# Patient Record
Sex: FEMALE | Race: BLACK OR AFRICAN AMERICAN
[De-identification: names, ages, dates, MRNs, and addresses within clinical notes are randomized per-mention and may not be internally consistent; named-entity substitution may affect disease eponyms.]

---

## 2019-04-02 ENCOUNTER — HOSPITAL ENCOUNTER (OUTPATIENT)
Dept: HOSPITAL 53 - M ED | Age: 34
Setting detail: OBSERVATION
LOS: 2 days | Discharge: HOME | End: 2019-04-04
Attending: INTERNAL MEDICINE | Admitting: INTERNAL MEDICINE
Payer: COMMERCIAL

## 2019-04-02 VITALS — SYSTOLIC BLOOD PRESSURE: 119 MMHG | DIASTOLIC BLOOD PRESSURE: 77 MMHG

## 2019-04-02 VITALS — BODY MASS INDEX: 25.48 KG/M2 | HEIGHT: 67 IN | WEIGHT: 162.35 LBS

## 2019-04-02 VITALS — DIASTOLIC BLOOD PRESSURE: 85 MMHG | SYSTOLIC BLOOD PRESSURE: 128 MMHG

## 2019-04-02 VITALS — SYSTOLIC BLOOD PRESSURE: 127 MMHG | DIASTOLIC BLOOD PRESSURE: 87 MMHG

## 2019-04-02 DIAGNOSIS — M62.82: Primary | ICD-10-CM

## 2019-04-02 DIAGNOSIS — F41.9: ICD-10-CM

## 2019-04-02 DIAGNOSIS — D72.829: ICD-10-CM

## 2019-04-02 DIAGNOSIS — D64.9: ICD-10-CM

## 2019-04-02 DIAGNOSIS — E87.6: ICD-10-CM

## 2019-04-02 DIAGNOSIS — D57.3: ICD-10-CM

## 2019-04-02 LAB
ALBUMIN SERPL BCG-MCNC: 4.3 GM/DL (ref 3.2–5.2)
ALT SERPL W P-5'-P-CCNC: 38 U/L (ref 12–78)
BASOPHILS # BLD AUTO: 0 10^3/UL (ref 0–0.2)
BASOPHILS NFR BLD AUTO: 0.2 % (ref 0–1)
BILIRUB SERPL-MCNC: 0.5 MG/DL (ref 0.2–1)
BUN SERPL-MCNC: 13 MG/DL (ref 7–18)
CALCIUM SERPL-MCNC: 8.6 MG/DL (ref 8.5–10.1)
CHLORIDE SERPL-SCNC: 107 MEQ/L (ref 98–107)
CK MB CFR.DF SERPL CALC: 0.38
CK MB SERPL-MCNC: 7 NG/ML (ref ?–3.6)
CK SERPL-CCNC: 1911 U/L (ref 26–192)
CO2 SERPL-SCNC: 25 MEQ/L (ref 21–32)
CREAT SERPL-MCNC: 1.2 MG/DL (ref 0.55–1.3)
EOSINOPHIL # BLD AUTO: 0 10^3/UL (ref 0–0.5)
EOSINOPHIL NFR BLD AUTO: 0 % (ref 0–3)
GFR SERPL CREATININE-BSD FRML MDRD: > 60 ML/MIN/{1.73_M2} (ref 60–?)
GLUCOSE SERPL-MCNC: 102 MG/DL (ref 70–100)
HCT VFR BLD AUTO: 35.7 % (ref 36–47)
HGB BLD-MCNC: 12.1 G/DL (ref 12–15.5)
LYMPHOCYTES # BLD AUTO: 0.5 10^3/UL (ref 1.5–4.5)
LYMPHOCYTES NFR BLD AUTO: 2.7 % (ref 24–44)
MCH RBC QN AUTO: 26.5 PG (ref 27–33)
MCHC RBC AUTO-ENTMCNC: 33.9 G/DL (ref 32–36.5)
MCV RBC AUTO: 78.1 FL (ref 80–96)
MONOCYTES # BLD AUTO: 0.8 10^3/UL (ref 0–0.8)
MONOCYTES NFR BLD AUTO: 4.1 % (ref 0–5)
MYOGLOBIN UR QL: NEGATIVE
NEUTROPHILS # BLD AUTO: 16.9 10^3/UL (ref 1.8–7.7)
NEUTROPHILS NFR BLD AUTO: 92.3 % (ref 36–66)
PLATELET # BLD AUTO: 267 10^3/UL (ref 150–450)
POTASSIUM SERPL-SCNC: 3.4 MEQ/L (ref 3.5–5.1)
PROT SERPL-MCNC: 7.5 GM/DL (ref 6.4–8.2)
RBC # BLD AUTO: 4.57 10^6/UL (ref 4–5.4)
SODIUM SERPL-SCNC: 140 MEQ/L (ref 136–145)
WBC # BLD AUTO: 18.3 10^3/UL (ref 4–10)

## 2019-04-02 RX ADMIN — SODIUM CHLORIDE SCH MLS/HR: 9 INJECTION, SOLUTION INTRAVENOUS at 13:42

## 2019-04-02 RX ADMIN — SODIUM CHLORIDE SCH MLS/HR: 9 INJECTION, SOLUTION INTRAVENOUS at 11:45

## 2019-04-02 NOTE — HPEPDOC
General


Date of Admission


Apr 2, 2019 at 11:45


Chief Complaint


The patient is a 33-year-old female admitted with lower extremity weakness/ Rhab

domyolysis.


Source:  Patient


Exam Limitations:  No limitations





History of Present Illness


The patient is a 33-year-old female who presents today after running 11 miles in

a "ruck" march which involved carrying heavy weight as well. The patient reports

that total march was for 12 miles. However, towards the last mile, she reports 

she was very thirsty and hungry, was sweating and felt her legs giving way and 

was feeling too weak complete the march. The patient was seen by a medic at that

time. She reports she was cold, shaking and hungry. She reports she went back to

the hotel and was planning on going to sleep, however, she was advised by the 

medic to go to the hospital. She denied any associated headache/change in 

vision/nausea/vomiting/chest pain/shortness of breath/abdominal pain. Denies any

aching of her muscles. She reports her urine when she urinated last time was 

light yellow in color.





In the ER, patient was noticed to have rhabdomyolysis. Admission was requested 

given her lower extremity weakness in the setting of rhabdomyolysis for IV 

hydration and overnight observation.





Home Medications


No Active Prescriptions or Reported Meds





Allergies


Coded Allergies:  


     No Known Allergies (Unverified , 4/2/19)





Past Medical History


Medical History


Sickle cell trait, anxiety


Surgical History


Denies any previous surgeries. She has 2 kids born by natural childbirth.





Family History


Significant Family History:  No pertinent family hx


Patient denies any significant medical problems in her familythis was 

personally reviewed.





Social History


* Smoker:  Denies


Alcohol:  occationally


Drugs:  denies





Review of Systems


Other systems


Negative for 10 systems except as noted under history of present illness





Physical Examination


General Exam:  Positive: Alert, Cooperative, No Acute Distress


Eye Exam:  Positive: PERRLA


ENT Exam:  Positive: Mucous membr. moist/pink


Chest Exam:  Positive: Clear to auscultation, Normal air movement; 


   Negative: Rales, Rhonchi, Wheezing


Heart Exam:  Positive: Rate Normal, Normal S1, Normal S2; 


   Negative: Murmurs, Rubs


Abdomen Exam:  Positive: Soft, Other (nontender)


Extremity Exam:  Positive: Other (no tenderness to palpation overt thighs/ 

calves)


Skin Exam:  Positive: Nl turgor and temperature


Neuro Exam:  Positive: Normal Speech, Strength at 5/5 X4 ext, Cranial Nerves 3-

12 NL, Reflexes 2+


Psych Exam:  Positive: Mental status NL, Mood NL, Oriented x 3





Vital Signs





Vital Signs








  Date Time  Temp Pulse Resp B/P (MAP) Pulse Ox O2 Delivery O2 Flow Rate FiO2


 


4/2/19 20:00 97.7 69 20 119/77 (91) 98   


 


4/2/19 13:07      Room Air  











Laboratory Data


Labs 24H


Laboratory Tests 2


4/2/19 09:48: 


Anion Gap 8, Glomerular Filtration Rate > 60.0, Blood Urea Nitrogen 13, 

Creatinine 1.20, Sodium Level 140, Potassium Level 3.4L, Chloride Level 107, 

Carbon Dioxide Level 25, Calcium Level 8.6, Aspartate Amino Transf (AST/SGOT) 

56H, Alanine Aminotransferase (ALT/SGPT) 38, Total Creatine Kinase 1911H, 

Alkaline Phosphatase 69, Total Bilirubin 0.5, Total Protein 7.5, Albumin 4.3, 

Creatine Kinase MB 7.0H, Creatine Kinase MB Relative Index 0.38, 

Albumin/Globulin Ratio 1.34


4/2/19 09:49: 


Immature Granulocyte % (Auto) 0.7, White Blood Count 18.3H, Red Blood Count 

4.57, Hemoglobin 12.1, Hematocrit 35.7L, Mean Corpuscular Volume 78.1L, Mean 

Corpuscular Hemoglobin 26.5L, Mean Corpuscular Hemoglobin Concent 33.9, Red Cell

Distribution Width 14.5, Platelet Count 267, Neutrophils (%) (Auto) 92.3H, 

Lymphocytes (%) (Auto) 2.7L, Monocytes (%) (Auto) 4.1, Eosinophils (%) (Auto) 0.

0, Basophils (%) (Auto) 0.2, Neutrophils # (Auto) 16.9H, Lymphocytes # (Auto) 

0.5L, Monocytes # (Auto) 0.8, Eosinophils # (Auto) 0.0, Basophils # (Auto) 0.0, 

Nucleated Red Blood Cells % (auto) 0.0, Urine Color YELLOW, Urine Appearance 

HAZY, Urine pH 5.0, Urine Specific Gravity 1.012, Urine Protein 1+H, Urine 

Glucose (UA) NEGATIVE, Urine Ketones 1+H, Urine Blood NEGATIVE, Urine Nitrite 

NEGATIVE, Urine Bilirubin NEGATIVE, Urine Urobilinogen 0.2, Urine Leukocyte 

Esterase NEGATIVE, Urine WBC (Auto) 2, Urine RBC (Auto) 1, Urine Hyaline Casts 

(Auto) 0, Urine Bacteria (Auto) NEGATIVE, Urine Squamous Epithelial Cells 2, 

Urine Amorphous Sediment SMALLH, Urine Mucus (Auto) SMALL, Urine Sperm (Auto) , 

Urine Myoglobin NEGATIVE


CBC/BMP


Laboratory Tests


4/2/19 09:48








Calcium Level 8.6, Aspartate Amino Transf (AST/SGOT) 56 H, Alanine 

Aminotransferase (ALT/SGPT) 38, Total Creatine Kinase 1911 H, Alkaline 

Phosphatase 69, Total Bilirubin 0.5, Total Protein 7.5, Albumin 4.3





4/2/19 09:49








Red Blood Count 4.57, Mean Corpuscular Volume 78.1 L, Mean Corpuscular 

Hemoglobin 26.5 L, Mean Corpuscular Hemoglobin Concent 33.9, Red Cell 

Distribution Width 14.5, Neutrophils (%) (Auto) 92.3 H, Lymphocytes (%) (Auto) 

2.7 L, Monocytes (%) (Auto) 4.1, Eosinophils (%) (Auto) 0.0, Basophils (%) 

(Auto) 0.2, Neutrophils # (Auto) 16.9 H, Lymphocytes # (Auto) 0.5 L, Monocytes #

(Auto) 0.8, Eosinophils # (Auto) 0.0, Basophils # (Auto) 0.0





 Assessment/Plan


Acute rhabdomyolysis:


-Likely secondary to exertion


-Continue aggressive IV fluids


-Recheck CPK in a.m.


-Encouraged oral fluid intake





Lower extremity weakness:


-Subjective, likely related to above


-Normal strength on testing





Leucocytosis:


-Likely combination of dehydration and reactive


-Recheck in a.m.





Sickle cell trait:


-No intervention at this time





Hypokalemia:


-Replete and recheck





DVT prophylaxis:


-Low risk





CODE STATUS: Full code per my discussion with the patient





Disposition: Admitted as an observation status to medical surgical floor. 

Anticipated length of stay less than 2 midnights. Anticipate discharge home 

hopefully by tomorrow





Plan / VTE


VTE Prophylaxis Ordered?:  No


VTE Exclusion Mechanical Proph:  Low Risk for VTE


VTE Exclusion Pharmacological:  At Low Risk for VTE











JAS CODY MD               Apr 2, 2019 21:33

## 2019-04-03 VITALS — SYSTOLIC BLOOD PRESSURE: 130 MMHG | DIASTOLIC BLOOD PRESSURE: 80 MMHG

## 2019-04-03 VITALS — DIASTOLIC BLOOD PRESSURE: 87 MMHG | SYSTOLIC BLOOD PRESSURE: 129 MMHG

## 2019-04-03 VITALS — SYSTOLIC BLOOD PRESSURE: 130 MMHG | DIASTOLIC BLOOD PRESSURE: 88 MMHG

## 2019-04-03 VITALS — SYSTOLIC BLOOD PRESSURE: 121 MMHG | DIASTOLIC BLOOD PRESSURE: 77 MMHG

## 2019-04-03 VITALS — DIASTOLIC BLOOD PRESSURE: 82 MMHG | SYSTOLIC BLOOD PRESSURE: 124 MMHG

## 2019-04-03 LAB
ALBUMIN SERPL BCG-MCNC: 3.4 GM/DL (ref 3.2–5.2)
ALT SERPL W P-5'-P-CCNC: 35 U/L (ref 12–78)
BILIRUB SERPL-MCNC: 0.3 MG/DL (ref 0.2–1)
BUN SERPL-MCNC: 5 MG/DL (ref 7–18)
CALCIUM SERPL-MCNC: 7.7 MG/DL (ref 8.5–10.1)
CHLORIDE SERPL-SCNC: 115 MEQ/L (ref 98–107)
CK SERPL-CCNC: 2052 U/L (ref 26–192)
CO2 SERPL-SCNC: 23 MEQ/L (ref 21–32)
CREAT SERPL-MCNC: 0.88 MG/DL (ref 0.55–1.3)
GFR SERPL CREATININE-BSD FRML MDRD: > 60 ML/MIN/{1.73_M2} (ref 60–?)
GLUCOSE SERPL-MCNC: 86 MG/DL (ref 70–100)
HCT VFR BLD AUTO: 30.5 % (ref 36–47)
HGB BLD-MCNC: 10.4 G/DL (ref 12–15.5)
MAGNESIUM SERPL-MCNC: 2 MG/DL (ref 1.8–2.4)
MCH RBC QN AUTO: 26.3 PG (ref 27–33)
MCHC RBC AUTO-ENTMCNC: 34.1 G/DL (ref 32–36.5)
MCV RBC AUTO: 77.2 FL (ref 80–96)
PHOSPHATE SERPL-MCNC: 2.9 MG/DL (ref 2.5–4.9)
PLATELET # BLD AUTO: 228 10^3/UL (ref 150–450)
POTASSIUM SERPL-SCNC: 3.7 MEQ/L (ref 3.5–5.1)
PROT SERPL-MCNC: 5.8 GM/DL (ref 6.4–8.2)
RBC # BLD AUTO: 3.95 10^6/UL (ref 4–5.4)
SODIUM SERPL-SCNC: 144 MEQ/L (ref 136–145)
WBC # BLD AUTO: 5.7 10^3/UL (ref 4–10)

## 2019-04-03 RX ADMIN — SODIUM CHLORIDE SCH MLS/HR: 9 INJECTION, SOLUTION INTRAVENOUS at 09:37

## 2019-04-03 RX ADMIN — SODIUM CHLORIDE SCH MLS/HR: 4.5 INJECTION, SOLUTION INTRAVENOUS at 10:16

## 2019-04-03 RX ADMIN — SODIUM CHLORIDE SCH MLS/HR: 4.5 INJECTION, SOLUTION INTRAVENOUS at 14:43

## 2019-04-03 RX ADMIN — SODIUM CHLORIDE SCH MLS/HR: 4.5 INJECTION, SOLUTION INTRAVENOUS at 19:42

## 2019-04-03 RX ADMIN — SODIUM CHLORIDE SCH MLS/HR: 4.5 INJECTION, SOLUTION INTRAVENOUS at 23:45

## 2019-04-03 RX ADMIN — SODIUM CHLORIDE SCH MLS/HR: 9 INJECTION, SOLUTION INTRAVENOUS at 01:05

## 2019-04-03 NOTE — IPNPDOC
Subjective


Date Seen


The patient was seen on 4/3/19.





Subjective


Chief Complaint/HPI


Lower extremity weakness, rhabdomyolysis


Events since last encounter


The patient reports she is feeling better today. Was able to ambulate in the 

hallway without any problems. Tolerating ordered. No nausea vomiting. Denies any

chest pain or shortness of breath.





Objective


Physical Examination


General Exam:  Positive: Alert, Cooperative, No Acute Distress


Eye Exam:  Positive: PERRLA


ENT Exam:  Positive: Mucous membr. moist/pink


Chest Exam:  Positive: Clear to auscultation; 


   Negative: Rales, Rhonchi, Wheezing


Heart Exam:  Positive: Rate Normal, Normal S1, Normal S2


Abdomen Exam:  Positive: Soft, Other (nontender)


Skin Exam:  Positive: Nl turgor and temperature


Neuro Exam:  Positive: Other (strength 5 over 5 in bilateral lower extremities. 

Intact fine touch.)


Psych Exam:  Positive: Mental status NL, Mood NL, Oriented x 3





Assessment /Plan


Assessment


Acute rhabdomyolysis, worsening:


-Likely secondary to exertion


-Continue aggressive IV fluids - patient was on 200 mL per hour of half normal 

salinestill has worsening CK. I will increase IV fluids to 250 mL an hour.


-Recheck CK in a.m.


-Encouraged oral fluid intake





Lower extremity weakness:


-Subjective, likely related to above


-Has improved


-Normal strength on testing





Leucocytosis:


-Likely combination of dehydration and reactive


-Resolved with IV fluids





Sickle cell trait:


-No intervention at this time





Anemia:


-Likely dilutional related to IV fluids. Monitor. No indication for transfusion.





Hypokalemia:


-Resolved





DVT prophylaxis:


-Low risk





Dispositionpatient has worsening rhabdomyolysis despite aggressive IV 

hydration. She will require continued IV fluids at least until tomorrow. Recheck

CK in the morning. Anticipate discharge home once CKs are downtrending.





Plan/VTE


VTE Prophylaxis Ordered?:  No


VTE Exclusion Mechanical Proph:  Low Risk for VTE


VTE Exclusion Pharmacological:  At Low Risk for VTE





VS, I&O, 24H, Fishbone


Vital Signs/I&O





Vital Signs








  Date Time  Temp Pulse Resp B/P (MAP) Pulse Ox O2 Delivery O2 Flow Rate FiO2


 


4/3/19 16:00 98.2 63 18 124/82 (96) 98   


 


4/2/19 13:07      Room Air  














I&O- Last 24 Hours up to 6 AM 


 


 4/3/19





 06:00


 


Intake Total 2715 ml


 


Output Total 1550 ml


 


Balance 1165 ml











Laboratory Data


24H LABS


Laboratory Tests 2


4/3/19 06:49: 


Nucleated Red Blood Cells % (auto) 0.0, Anion Gap 6L, Glomerular Filtration Rate

> 60.0, Blood Urea Nitrogen 5#L, Creatinine 0.88, Sodium Level 144, Potassium 

Level 3.7, Chloride Level 115H, Carbon Dioxide Level 23, Calcium Level 7.7L, 

Phosphorus Level 2.9, Aspartate Amino Transf (AST/SGOT) 56H, Alanine 

Aminotransferase (ALT/SGPT) 35, Total Creatine Kinase 2052H, Alkaline 

Phosphatase 53, Total Bilirubin 0.3, Total Protein 5.8#L, Albumin 3.4#, 

Magnesium Level 2.0, Albumin/Globulin Ratio 1.42


4/3/19 15:42: Total Creatine Kinase 2235H


CBC/BMP


Laboratory Tests


4/3/19 06:49








Red Blood Count 3.95 L, Mean Corpuscular Volume 77.2 L, Mean Corpuscular 

Hemoglobin 26.3 L, Mean Corpuscular Hemoglobin Concent 34.1, Red Cell 

Distribution Width 14.5, Calcium Level 7.7 L, Phosphorus Level 2.9, Aspartate 

Amino Transf (AST/SGOT) 56 H, Alanine Aminotransferase (ALT/SGPT) 35, Total 

Creatine Kinase 2052 H, Alkaline Phosphatase 53, Total Bilirubin 0.3, Total 

Protein 5.8 #L, Albumin 3.4 #











JAS CODY MD               Apr 3, 2019 17:28

## 2019-04-04 VITALS — SYSTOLIC BLOOD PRESSURE: 140 MMHG | DIASTOLIC BLOOD PRESSURE: 95 MMHG

## 2019-04-04 VITALS — DIASTOLIC BLOOD PRESSURE: 84 MMHG | SYSTOLIC BLOOD PRESSURE: 124 MMHG

## 2019-04-04 LAB
BUN SERPL-MCNC: 5 MG/DL (ref 7–18)
CALCIUM SERPL-MCNC: 7.8 MG/DL (ref 8.5–10.1)
CHLORIDE SERPL-SCNC: 112 MEQ/L (ref 98–107)
CK SERPL-CCNC: 1655 U/L (ref 26–192)
CO2 SERPL-SCNC: 24 MEQ/L (ref 21–32)
CREAT SERPL-MCNC: 0.84 MG/DL (ref 0.55–1.3)
GFR SERPL CREATININE-BSD FRML MDRD: > 60 ML/MIN/{1.73_M2} (ref 60–?)
GLUCOSE SERPL-MCNC: 87 MG/DL (ref 70–100)
MAGNESIUM SERPL-MCNC: 1.9 MG/DL (ref 1.8–2.4)
PHOSPHATE SERPL-MCNC: 3.4 MG/DL (ref 2.5–4.9)
POTASSIUM SERPL-SCNC: 3.6 MEQ/L (ref 3.5–5.1)
SODIUM SERPL-SCNC: 141 MEQ/L (ref 136–145)

## 2019-04-04 RX ADMIN — SODIUM CHLORIDE SCH MLS/HR: 4.5 INJECTION, SOLUTION INTRAVENOUS at 03:26

## 2019-04-04 RX ADMIN — SODIUM CHLORIDE SCH MLS/HR: 4.5 INJECTION, SOLUTION INTRAVENOUS at 07:07

## 2019-04-04 NOTE — DS.PDOC
Discharge Summary


General


Date of Admission


Apr 2, 2019 at 11:45


Date of Discharge


4/4/19





Discharge Summary


PROCEDURES PERFORMED DURING STAY: None.





ADMITTING DIAGNOSES: 


Acute rhabdomyolysis, Lower extremity weakness, Leucocytosis, Sickle cell trait,

Hypokalemia





DISCHARGE DIAGNOSES:


Acute rhabdomyolysis not improving, lower extremity weakness resolved, 

leukocytosis resolved, sickle cell trait, anemia, hypokalemia resolved





COMPLICATIONS/CHIEF COMPLAINT: Rhabdomyolysis.





HISTORY OF PRESENT ILLNESS: The patient is a 33-year-old female who presented to

the ER with complaints of lower extremity weakness following an 11 mile "ruck-

march" with findings in the ER also showing rhabdomyolysis admitted to our 

facility for further treatment.





HOSPITAL COURSE: 


Acute rhabdomyolysis:


-Likely secondary to exertion


-The patient was admitted to our facility, treated aggressively with IV fluids


-Initially, the CPKs up trended, but finally, today, the CPK is 

downtrendingdown to 1600 today.


-The patient is tolerating oral intake well and drinking plenty of oral fluids


-Advised patient to avoid excess exertion or heavy weight lifting. Recheck CPK 

on Monday. Advised to drink plenty of oral fluids. The patient otherwise is 

doing better, able to ablate, lower extremity weakness has resolved, the patient

is asymptomatic and does not have any muscle soreness or pains and plan is for 

her to be discharged home today.





Lower extremity weakness:


-Subjective, likely related to above


-Has improved


-Normal strength on testing





Leucocytosis:


-Likely combination of dehydration and reactive


-Resolved with IV fluids





Sickle cell trait:


-No intervention at this time





Anemia:


-Likely dilutional related to IV fluids. No indication for transfusion.





Hypokalemia: 


-Was treated and resolved





On day of discharge, patient is tolerating oral intake, able to ambulate 

independently and is asymptomatic.





DISCHARGE MEDICATIONS: None


 


ALLERGIES: Please see below.





PHYSICAL EXAMINATION ON DISCHARGE:


VITAL SIGNS: Please see below.


GENERAL:  Lying in bed in no distress


HEENT: PERRLA


CARDIOVASCULAR EXAMINATION: S1, S2 heard, regular rate rhythm, no rubs or 

gallops


RESPIRATORY EXAMINATION: Clear to auscultation bilaterally. No wheeze, no 

crackles


ABDOMINAL EXAMINATION: Soft, nontender


EXTREMITIES: No edema. No tenderness to palpation.


NEUROLOGICAL EXAMINATION:  Awake, alert. Moving all 4 extremities.


PSYCHIATRIC EXAMINATION: Normal mood and affect





LABORATORY DATA: Please see below.





IMAGING: None





ACTIVITY: Avoid heavy weight lifting or excess exertion until cleared by primary

care provider





DIET: Regular





DISCHARGE PLAN: Patient to be discharged home today. Outpatient follow-up with 

primary care provider next week. CPK to be checked on Monday.





DISPOSITION: Home





DISCHARGE CONDITION: Stable.





TIME SPENT ON DISCHARGE: 32  minutes.





Vital Signs/I&Os





Vital Signs








  Date Time  Temp Pulse Resp B/P (MAP) Pulse Ox O2 Delivery O2 Flow Rate FiO2


 


4/4/19 09:00 97.3 57 18 140/95 (110) 98   


 


4/2/19 13:07      Room Air  














I&O- Last 24 Hours up to 6 AM 


 


 4/4/19





 05:59


 


Intake Total 3155 ml


 


Output Total 3700 ml


 


Balance -545 ml











Laboratory Data


Labs 24H


Laboratory Tests 2


4/4/19 06:51: 


Anion Gap 5L, Glomerular Filtration Rate > 60.0, Blood Urea Nitrogen 5L, 

Creatinine 0.84, Sodium Level 141, Potassium Level 3.6, Chloride Level 112H, 

Carbon Dioxide Level 24, Calcium Level 7.8L, Phosphorus Level 3.4, Magnesium 

Level 1.9, Total Creatine Kinase 1655H


CBC/BMP


Laboratory Tests


4/4/19 06:51








Calcium Level 7.8 L





Discharge Medications


No Active Prescriptions or Reported Meds





Allergies


Coded Allergies:  


     No Known Allergies (Unverified , 4/2/19)











JAS CODY MD               Apr 4, 2019 18:02